# Patient Record
Sex: MALE | Race: WHITE | NOT HISPANIC OR LATINO | URBAN - METROPOLITAN AREA
[De-identification: names, ages, dates, MRNs, and addresses within clinical notes are randomized per-mention and may not be internally consistent; named-entity substitution may affect disease eponyms.]

---

## 2018-04-10 ENCOUNTER — OFFICE VISIT - HEALTHEAST (OUTPATIENT)
Dept: PEDIATRICS | Facility: CLINIC | Age: 18
End: 2018-04-10

## 2018-04-10 DIAGNOSIS — M54.9 UPPER BACK PAIN ON RIGHT SIDE: ICD-10-CM

## 2021-05-28 ENCOUNTER — RECORDS - HEALTHEAST (OUTPATIENT)
Dept: ADMINISTRATIVE | Facility: CLINIC | Age: 21
End: 2021-05-28

## 2021-06-01 VITALS — WEIGHT: 212.4 LBS | BODY MASS INDEX: 28.81 KG/M2

## 2021-06-17 NOTE — PROGRESS NOTES
Assessment     1. Upper back pain on right side        Plan:     Likely mild muscle strain.  Cleared to return to work based on normal exam today  RTC for worsening symptoms  Pt will be establishing care with adult provider at this point.    Patient Instructions   Return for any worsening of symptoms.         Subjective:      HPI: Viktor Estrella is a 17 y.o. male - started with right upper back/shoulder pain when waking up after sleeping in a bad position last Wednesday. Got worse throughout the day. He started taking ibuprofen and by Friday the pain fully resolved. Was not seen at the time of initial pain. No numbness, tingling in extremities. Back to full range of motion. Needs clearance letter to go back to work. Does no do any heavy lifting at job.    History reviewed. No pertinent past medical history.  Past Surgical History:   Procedure Laterality Date     NO PAST SURGERIES       Review of patient's allergies indicates no known allergies.  Outpatient Medications Prior to Visit   Medication Sig Dispense Refill     CALCIUM ORAL Gummies Chew       methylphenidate (CONCERTA) 36 MG CR tablet Take 2 tablets in the morning 60 tablet 0     No facility-administered medications prior to visit.      Family History   Problem Relation Age of Onset     No Medical Problems Mother      No Medical Problems Father      Social History     Social History Narrative     Patient Active Problem List   Diagnosis     Attention-deficit Hyperactivity Disorder     Allergic Rhinitis     ADHD (attention deficit hyperactivity disorder), combined type     Nocturnal enuresis       Review of Systems  Gen: No fever or fatigue  Eyes: No eye discharge.   ENT: No nasal congestion. No pharyngitis. No otalgia.  Resp: No SOB, cough or wheezing.  GI:No diarrhea, nausea or vomiting. No constipation.  :No dysuria, normal UOP  MS: right upper back pain - resolved  Skin: No rashes  Neuro: No headaches.   Lymph/Hematologic: No gland swelling    No  results found for this or any previous visit (from the past 240 hour(s)).    Objective:     Vitals:    04/10/18 1354   BP: 118/62   Pulse: 80   SpO2: 99%   Weight: 212 lb 6.4 oz (96.3 kg)       Physical Exam:   Gen - Alert, no acute distress.   HEENT - MMM  Neck - supple without adenopathy or thyromegaly.  Lungs - have good air entry bilaterally, no wheezes or crackles.  No prolongation of expiratory phase.   No tachypnea, retractions, or increased work of breathing.  Cardiac - regular rate and rhythm, normal S1 and S2.  Skin - clear without rash  Neuro -  moving all extremities equally, normal muscle tone in all 4 extremities  M/S - full ROM of right shoulder, spine, no pain to palpation throughout his back    Isaura Irby MD